# Patient Record
Sex: MALE | Race: OTHER | NOT HISPANIC OR LATINO | URBAN - METROPOLITAN AREA
[De-identification: names, ages, dates, MRNs, and addresses within clinical notes are randomized per-mention and may not be internally consistent; named-entity substitution may affect disease eponyms.]

---

## 2020-12-01 ENCOUNTER — EMERGENCY (EMERGENCY)
Facility: HOSPITAL | Age: 55
LOS: 1 days | Discharge: HOME | End: 2020-12-01
Admitting: EMERGENCY MEDICINE
Payer: COMMERCIAL

## 2020-12-01 VITALS
TEMPERATURE: 98 F | SYSTOLIC BLOOD PRESSURE: 158 MMHG | OXYGEN SATURATION: 100 % | DIASTOLIC BLOOD PRESSURE: 99 MMHG | HEART RATE: 88 BPM | RESPIRATION RATE: 18 BRPM

## 2020-12-01 DIAGNOSIS — Z20.828 CONTACT WITH AND (SUSPECTED) EXPOSURE TO OTHER VIRAL COMMUNICABLE DISEASES: ICD-10-CM

## 2020-12-01 PROCEDURE — 99283 EMERGENCY DEPT VISIT LOW MDM: CPT

## 2020-12-01 PROCEDURE — U0003: CPT

## 2020-12-01 NOTE — ED PROVIDER NOTE - PRINCIPAL DIAGNOSIS
Encounter for medical screening examination Epidermal Closure Graft Donor Site (Optional): simple interrupted

## 2020-12-01 NOTE — ED PROVIDER NOTE - PATIENT PORTAL LINK FT
You can access the FollowMyHealth Patient Portal offered by [facility] by registering at the following website: [email or web address]. By joining [employer]’s FollowMyHealth portal, you will also be able to view your health information using other applications (apps) compatible with our system.

## 2021-03-18 ENCOUNTER — EMERGENCY (EMERGENCY)
Facility: HOSPITAL | Age: 56
LOS: 1 days | Discharge: HOME | End: 2021-03-18
Admitting: EMERGENCY MEDICINE
Payer: COMMERCIAL

## 2021-03-18 VITALS
SYSTOLIC BLOOD PRESSURE: 163 MMHG | TEMPERATURE: 98 F | RESPIRATION RATE: 16 BRPM | HEART RATE: 100 BPM | DIASTOLIC BLOOD PRESSURE: 92 MMHG | OXYGEN SATURATION: 98 %

## 2021-03-18 DIAGNOSIS — Z20.822 CONTACT WITH AND (SUSPECTED) EXPOSURE TO COVID-19: ICD-10-CM

## 2021-03-18 LAB — SARS-COV-2 RNA SPEC QL NAA+PROBE: SIGNIFICANT CHANGE UP

## 2021-03-18 PROCEDURE — 99283 EMERGENCY DEPT VISIT LOW MDM: CPT

## 2021-03-18 PROCEDURE — U0005: CPT

## 2021-03-18 PROCEDURE — U0003: CPT

## 2021-03-18 PROCEDURE — 99282 EMERGENCY DEPT VISIT SF MDM: CPT

## 2021-03-18 NOTE — ED PROVIDER NOTE - PATIENT PORTAL LINK FT
You can access the FollowMyHealth Patient Portal offered by [facility] by registering at the following website: [email or web address]. By joining [employer]’s FollowMyHealth portal, you will also be able to view your health information using other applications (apps) compatible with our system.

## 2021-03-18 NOTE — ED ADULT TRIAGE NOTE - CHIEF COMPLAINT QUOTE
Pt requesting covid swab. Denies recent exposure. Denies fever, chills, NVD, CP, SOB, loss of taste or smell, sore throat.

## 2021-06-09 NOTE — ED ADULT TRIAGE NOTE - MODE OF ARRIVAL
[FreeTextEntry1] : EKG 6/9/2021.  Dual-chamber pacing.  Magnet rate equals 85, nominal\par EKG 12/7/2020.  Dual-chamber pacing.  Magnet rate equals 85, normal.\par EKG 6/3/2020.  Sinus rhythm with a–V pacing.  Magnet rate equals 85, nominal\par EKG 6/12/19. Dual-chamber pacemaker. VPC. Magnet rate nominal 85 Walk in

## 2023-05-31 NOTE — ED ADULT TRIAGE NOTE - BP NONINVASIVE SYSTOLIC (MM HG)
[address] [facility]  Discharge- [place] 1940, [de-identified] y o  male MRN: [id number]  Unit/Bed#: [facility] 501-01 Encounter: [id number]  Primary Care Provider: Sadie Sinha DO   Date and time admitted to hospital: 5/25/2023 10:33 AM    * Atherosclerosis of artery of extremity with ulceration [facility])  Assessment & Plan  [de-identified] y/o M w/ Right leg rest pain and foot wound w/ known hx of PAD  PMHx CAD, severe Left subclavian stenosis/occl, CKD, COPD, severe protein calorie malnutrition, HTN, HLD, hyperparathyroidism, Gout, PUD, esophagitis, and tobacco abuse    5/17/2023 LEAD- AKUA 0 22 in the ischemic range  5/26/23 Diagnostic right lower extremity a gram  -Right lower extremity angiogram demonstrating 80% stenosis of right common femoral artery with high-grade stenosis/occlusion of the profunda  Long segment occlusion of the mid SFA with distal reconstitution above-knee pop  PT is primary runoff into the foot  Plan:  -Unfortunately, not amenable to endovascular revascularization and would require extensive open revascularization including common femoral endarterectomy, profundoplasty, fem-PT bypass  -Due to severe protein calorie malnutrition and frailty and extent revascularization required, he is a poor surgical candidate with risk of poor wound healing, bypass graft failure, and need for further intervention  -Patient's pain is currently controlled and the wound is not infected  -Patient in agreement for palliative local wound care and pain mgt  -PT/OT - recommend rehab  -Discharge to [place] for rehab    CKD (chronic kidney disease)  Assessment & Plan  Chronic kidney disease stage IIIB    Plan:  Outpatient follow-up with Nephrology  Follow-up labs as per CEE protocol    Asymptomatic carotid artery stenosis, bilateral  Assessment & Plan  Recently evaluated for asymptomatic bilateral carotid stenosis with plan for CEA in near future   Surgical clearances previously obtained from medicine, cardiology and pulmonary  Plan:  Outpatient follow-up   Continue Plavix, lipitor        Secondary Diagnosis:  Past Medical History:   Diagnosis Date   • Abnormal thyroid function test     non specified /  LA    1/8/13     • Chronic kidney disease (CKD), stage III (moderate) (HCC)    • COPD (chronic obstructive pulmonary disease) ([facility])    • Eczema     LA   11/26/13      • Esophagitis    • Gout    • Hypertension    • PAD (peripheral artery disease) ([facility])    • Pyloric channel ulcer      Past Surgical History:   Procedure Laterality Date   • BACK SURGERY     • ESOPHAGOGASTRODUODENOSCOPY N/A 12/1/2016    Procedure: ESOPHAGOGASTRODUODENOSCOPY (EGD); Surgeon: Ashley Davila MD;  Location: [facility]; Service:    • EYE SURGERY     • IR LOWER EXTREMITY ANGIOGRAM  5/26/2023        Admitting Diagnosis: PVD (peripheral vascular disease) with claudication ([facility]) [I73 9]  Peripheral arterial disease ([facility]) [I73 9]  Right foot pain [M79 671]  PAD (peripheral artery disease) ([facility]) [I73 9]  Hypertensive urgency [I16 0]  Ischemic rest pain of lower extremity [M79 606, I99 8]  Ischemic ulcer of foot, limited to breakdown of skin, right ([facility]) [L97 511]  Gout of right foot, unspecified cause, unspecified chronicity [M10 9]  Stage 3b chronic kidney disease ([address] ) [N18 32]    Attending: Dr Jaqueline Galan    Consultants:   [address] [place] [address]  Nephrology  Podiatry    Procedures Performed:   Diagnostic Agram 5/26    Discharge Medications:  See after visit summary for reconciled discharge medications provided to patient and family  HPI: Eliz Chambers is a [de-identified] y o  male with past medical history of PAD, CAD, COPD, CKD, subclavian stenosis, HTN, HLD, severe protein calorie malnutrition, BMI 16 6, hyperparathyroidism, gout, peptic ulcer disease, esophagitis, eczema and tobacco abuse who presents from the vascular center where he saw Dr Lane Officer in office visit for rest pain and right foot wound    He was directed to present to the emergency department for evaluation and expedited arteriogram   He had recent evaluation in the emergency department on 5/11/2023 for rest pain and lower extremity arterial duplex was performed on 5/17/2023  Duplex reveals greater than 75% proximal SFA stenosis and distal SFA occlusion on the right with AKUA 0 22 and metatarsal and great toe pressures were unobtainable  Patient's visit today was in follow-up from the hospital and his recent duplex  On discussion with the patient he currently is not having right foot pain at the moment however he has been experiencing an increase in right foot pain at night and with activity which is relieved with putting his feet down and rest   He does have right lateral foot necrotic wound which has worsened recently  The symptoms have been present for approximately 2 to 4 weeks  Also of note the patient has been following in the vascular center recently for asymptomatic bilateral carotid stenosis and was planned for carotid endarterectomy in the near future  He has had clearances obtained from medicine, cardiology and pulmonology to proceed with surgery  At this time he denies any strokelike symptoms or visual changes  He denies chest pain, shortness of breath, fevers or chills  He does get dyspnea on exertion at baseline  He is currently on Plavix and statin therapy  Hospital Course: Following presentation to the hospital he was admitted to the vascular surgery service with plan for arteriogram   Medical consultation was obtained for medical management and treatment of conjunctivitis  Nephrology was consulted due to history of CKD stage III and need for arteriogram   On 5/26/2023 he underwent diagnostic arteriogram which demonstrated 80% stenosis of right common femoral artery with high-grade stenosis/occlusion of the profunda  Long segment occlusion of the mid SFA with distal reconstitution above-knee popliteal artery, PT is primary runoff into the foot    Unfortunately this was not amenable to endovascular treatment  Discussions were had with the patient and family and vascular surgery team that due to severe protein calorie malnutrition and frailty and the extent of open surgical revascularization required he would be a poor surgical candidate  Other option for pain control is amputation  At the time patient's pain is controlled and there is no significant infection of the foot  The patient and family decided to pursue palliative wound care and pain management for rest pain  Postprocedural he was noted to have elevated creatinine which remained near to his baseline  He will have outpatient follow-up with [facility] and outpatient follow-up with the nephrologist   He was seen in evaluation by physical therapy and Occupational Therapy and was recommended for acute rehab  He will continue with aspirin and Plavix therapy  Outpatient follow-up in the vascular center was arranged prior to his discharge  Condition at Discharge: stable     Provisions for Follow-Up Care:  See after visit summary for information related to follow-up care and any pertinent home health orders  Disposition: Short-term rehab at [facility]    Discharge instructions/Information to patient and family:   See after visit summary for information provided to patient and family  Planned Readmission: No    Discharge Statement   I spent 30 minutes discharging the patient  This time was spent on the day of discharge  I had direct contact with the patient on the day of discharge  Additional documentation is required if more than 30 minutes were spent on discharge  158